# Patient Record
(demographics unavailable — no encounter records)

---

## 2024-10-21 NOTE — PHYSICAL EXAM
[Well Developed] : well developed [Normal Conjunctiva] : normal conjunctiva [Normal Venous Pressure] : normal venous pressure [No Carotid Bruit] : no carotid bruit [Normal S1, S2] : normal S1, S2 [S4] : S4 [Clear Lung Fields] : clear lung fields [Good Air Entry] : good air entry [Soft] : abdomen soft [No Masses/organomegaly] : no masses/organomegaly [Abnormal Gait] : abnormal gait [No Edema] : no edema [Normal] : moves all extremities, no focal deficits, normal speech [Alert and Oriented] : alert and oriented [Cognitive Impairment] : cognitive impairment [de-identified] : weakness lower ext

## 2024-10-21 NOTE — REVIEW OF SYSTEMS
[Blurry Vision] : blurred vision [SOB] : shortness of breath [Dyspnea on exertion] : dyspnea during exertion [Chest Discomfort] : chest discomfort [Lower Ext Edema] : no extremity edema [Leg Claudication] : no intermittent leg claudication [Palpitations] : no palpitations [Orthopnea] : no orthopnea [PND] : no PND [Syncope] : no syncope [Change In The Stool] : no change in stool [Blood in stool] : no blood in stoo [Erectile Dysfunction] : erectile dysfunction [Joint Pain] : joint pain [Weakness] : weakness [Memory Lapses Or Loss] : memory lapses or loss [Under Stress] : under stress [Negative] : Gastrointestinal [FreeTextEntry8] : incontinent post surgery  [FreeTextEntry9] : lower back pain
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
## 2024-10-21 NOTE — CARDIOLOGY SUMMARY
[de-identified] : EC2024 Reviewed by Me , NSR at 75/min , Normal intervals , Normal R progression, WNL

## 2025-01-06 NOTE — REVIEW OF SYSTEMS
[Weight Gain (___ Lbs)] : [unfilled] ~Ulb weight gain [SOB] : shortness of breath [Dyspnea on exertion] : dyspnea during exertion [Chest Discomfort] : chest discomfort [Lower Ext Edema] : no extremity edema [Leg Claudication] : no intermittent leg claudication [Palpitations] : no palpitations [Orthopnea] : no orthopnea [Syncope] : no syncope [Cough] : cough [Erectile Dysfunction] : erectile dysfunction [Joint Pain] : joint pain [Joint Swelling] : joint swelling [Myalgia] : myalgia [Tremor] : a tremor was seen [Tingling (Paresthesia)] : tingling [Weakness] : weakness [Negative] : Heme/Lymph [FreeTextEntry8] : incontinence

## 2025-01-06 NOTE — ASSESSMENT
[FreeTextEntry1] : Hypertension controlled continue medications.  Hyperlipidemia will continue to monitor I have asked him to send me his next LDL 1 performed by his PCP.  Palpitations remains in normal sinus rhythm we will continue medications.  Shortness of breath on exertion could be related largely because of his weight gain however will get an echocardiogram to rule out right ventricular dysfunction if any.  Back pain due to prior arthritic joints as well as injury he is on opioid medication and is receiving appropriate treatment by various other specialists.
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
## 2025-01-06 NOTE — PHYSICAL EXAM
[Well Developed] : well developed [Obese] : obese [Normal Conjunctiva] : normal conjunctiva [No Carotid Bruit] : no carotid bruit [Elevated JVD ____cm] : elevated JVD ~Vcm [S4] : S4 [Murmur] : murmur [Clear Lung Fields] : clear lung fields [Good Air Entry] : good air entry [Soft] : abdomen soft [No Masses/organomegaly] : no masses/organomegaly [Abnormal Gait] : abnormal gait [No Edema] : no edema [Normal Radial B/L] : normal radial B/L [Normal PT B/L] : normal PT B/L [Normal] : moves all extremities, no focal deficits, normal speech [Moves all extremities] : moves all extremities [Cognitive Impairment] : cognitive impairment [Appears Anxious] : appears anxious [de-identified] : ESM

## 2025-01-06 NOTE — CARDIOLOGY SUMMARY
[de-identified] : EKG reviewed by me done on 1/6/2025 showed normal sinus rhythm at 78 bpm and poor R wave progression which was positional.

## 2025-04-24 NOTE — REASON FOR VISIT
[Symptom and Test Evaluation] : symptom and test evaluation [Arrhythmia/ECG Abnorrmalities] : arrhythmia/ECG abnormalities [Structural Heart and Valve Disease] : structural heart and valve disease [Hyperlipidemia] : hyperlipidemia [Hypertension] : hypertension [FreeTextEntry1] : Preoperative evaluation for planned left knee TKR

## 2025-04-24 NOTE — PHYSICAL EXAM
[Well Developed] : well developed [Obese] : obese [Normal Conjunctiva] : normal conjunctiva [Normal Venous Pressure] : normal venous pressure [No Carotid Bruit] : no carotid bruit [Normal S1, S2] : normal S1, S2 [S4] : S4 [Murmur] : murmur [Clear Lung Fields] : clear lung fields [Wheeze ____] : wheeze [unfilled] [Soft] : abdomen soft [Non Tender] : non-tender [Normal Gait] : normal gait [No Edema] : no edema [Normal PT B/L] : normal PT B/L [Normal] : moves all extremities, no focal deficits, normal speech [Moves all extremities] : moves all extremities [No Focal Deficits] : no focal deficits [Alert and Oriented] : alert and oriented [de-identified] : ESM

## 2025-04-24 NOTE — CARDIOLOGY SUMMARY
[de-identified] : Electrocardiogram done today on 4/24/2025  reviewed by me reveals normal sinus rhythm at 70 bpm normal intervals essentially within normal limits. [de-identified] : 4/24/2025 CONCLUSIONS: 1. Left ventricular systolic function is normal with an ejection fraction of 56 % by Live's method of  disks. 2. There is mild (grade 1) left ventricular diastolic dysfunction. 3. Mildly enlarged right ventricular cavity size. 4. There is minimal thickening of the aortic valve leaflets. 5. Mild mitral regurgitation. 6. Mild tricuspid regurgitation. 7. No prior echocardiogram is available for comparison.  [de-identified] : Patient had a CTA done on 3/20/2023 which revealed normal coronary arteries, calcium score was 0, and mild emphysema.

## 2025-04-24 NOTE — REVIEW OF SYSTEMS
[Weight Gain (___ Lbs)] : [unfilled] ~Ulb weight gain [Feeling Fatigued] : feeling fatigued [Blurry Vision] : blurred vision [Sore Throat] : sore throat [SOB] : no shortness of breath [Dyspnea on exertion] : dyspnea during exertion [Chest Discomfort] : chest discomfort [Lower Ext Edema] : lower extremity edema [Leg Claudication] : no intermittent leg claudication [Palpitations] : no palpitations [Orthopnea] : no orthopnea [PND] : no PND [Syncope] : no syncope [Cough] : cough [Wheezing] : wheezing [Coughing Up Blood] : no hemoptysis [Urinary Frequency] : urinary frequency [Erectile Dysfunction] : erectile dysfunction [Joint Pain] : joint pain [Joint Swelling] : joint swelling [Joint Stiffness] : joint stiffness [Muscle Cramps] : muscle cramps [Dizziness] : no dizziness [Tremor] : no tremor was seen [Convulsions] : no convulsions [Tingling (Paresthesia)] : no tingling [Limb Weakness (Paresis)] : no limb weakness (Paresis) [Confusion] : no confusion was observed [Under Stress] : not under stress [Negative] : Integumentary [FreeTextEntry9] : multiple joint issues
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
## 2025-04-24 NOTE — ASSESSMENT
[FreeTextEntry1] : Hyperlipidemia controlled continue current medication #2 hypertension controlled on medications to continue.  Shortness of breath on exertion partly related to his obesity and partly has COPD patient is not on home medications advised to lose weight and exercise when possible.  Erectile dysfunction he will use Cialis.  Patient has no evidence of coronary artery disease within normal CTA in 2023 and  Normal electrocardiogram.   his cardiac status is stable for the plan surgical procedure.

## 2025-07-24 NOTE — CARDIOLOGY SUMMARY
[de-identified] : EKG done today 7/24/2025 reviewed by me reveals normal sinus rhythm at 70 bpm normal QRS T frontal plane axis within normal limits. [de-identified] : 4/24/2025 CONCLUSIONS: 1. Left ventricular systolic function is normal with an ejection fraction of 56 % by Live's method of disks. 2. There is mild (grade 1) left ventricular diastolic dysfunction. 3. Mildly enlarged right ventricular cavity size. 4. There is minimal thickening of the aortic valve leaflets. 5. Mild mitral regurgitation. 6. Mild tricuspid regurgitation. 7. No prior echocardiogram is available for comparison. [de-identified] : Patient had a CTA done on 3/20/2023 which revealed normal coronary arteries, calcium score was 0, and mild emphysema

## 2025-07-24 NOTE — REVIEW OF SYSTEMS
[Feeling Fatigued] : feeling fatigued [Dyspnea on exertion] : dyspnea during exertion [Chest Discomfort] : chest discomfort [Cough] : cough [Abdominal Pain] : abdominal pain [Nausea] : nausea [Joint Pain] : joint pain [Joint Swelling] : joint swelling [Tingling (Paresthesia)] : tingling [Negative] : Heme/Lymph [Leg Claudication] : no intermittent leg claudication [Palpitations] : no palpitations [Orthopnea] : no orthopnea [Syncope] : no syncope [Change In The Stool] : no change in stool [FreeTextEntry9] : l knee

## 2025-07-24 NOTE — PHYSICAL EXAM
[Well Developed] : well developed [Normal Conjunctiva] : normal conjunctiva [Normal Venous Pressure] : normal venous pressure [No Carotid Bruit] : no carotid bruit [Normal S1, S2] : normal S1, S2 [S4] : S4 [Murmur] : murmur [Clear Lung Fields] : clear lung fields [Good Air Entry] : good air entry [Soft] : abdomen soft [Non Tender] : non-tender [Normal Gait] : normal gait [No Edema] : no edema [Normal PT B/L] : normal PT B/L [No Rash] : no rash [Alert and Oriented] : alert and oriented [Normal memory] : normal memory [de-identified] : ESM

## 2025-07-24 NOTE — ASSESSMENT
[FreeTextEntry1] : Blood pressure elevated uncontrolled patient and his wife educated on the importance of drug compliance he claims that he will take his medication.  Palpitations stable in normal sinus rhythm will monitor.  Shortness of breath on exertion probably related to elevated blood pressure with diastolic dysfunction will monitor repeat blood pressures in 1 month.  Hyperlipidemia check lipid levels drawn at his PCPs office to assess for compliance.

## 2025-07-24 NOTE — HISTORY OF PRESENT ILLNESS
[FreeTextEntry1] : 71-year-old male with history of hypertension hyperlipidemia cancer of the prostate status post prostatectomy.  He had a arthroscopic surgery of his left knee continues to have pain in his left knee with decreased ambulation  Therapy also has pain in his left shoulder patient has not been taking his antihypertensive medications as prescribed and comes in complaining of shortness of breath on mild activity no chest pain occasional palpitations no syncope.